# Patient Record
Sex: FEMALE | HISPANIC OR LATINO | Employment: PART TIME | URBAN - METROPOLITAN AREA
[De-identification: names, ages, dates, MRNs, and addresses within clinical notes are randomized per-mention and may not be internally consistent; named-entity substitution may affect disease eponyms.]

---

## 2017-01-01 ENCOUNTER — APPOINTMENT (OUTPATIENT)
Dept: RADIOLOGY | Facility: IMAGING CENTER | Age: 32
End: 2017-01-01
Attending: PHYSICIAN ASSISTANT
Payer: COMMERCIAL
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
## 2017-01-01 DIAGNOSIS — M53.3 COCCYDYNIA: ICD-10-CM

## 2017-01-01 PROCEDURE — 72220 X-RAY EXAM SACRUM TAILBONE: CPT | Mod: TC | Performed by: PHYSICIAN ASSISTANT

## 2017-03-16 ENCOUNTER — TELEPHONE (OUTPATIENT)
Dept: MEDICAL GROUP | Facility: PHYSICIAN GROUP | Age: 32
End: 2017-03-16

## 2017-03-16 NOTE — TELEPHONE ENCOUNTER
Called Lucy Aldrich and left a message to return my call regarding his/her upcoming NP appointment with Gene Richard M.D..   If patient returns the call please transfer them to extension: [phone] @ 8:52

## 2017-03-22 ENCOUNTER — APPOINTMENT (OUTPATIENT)
Dept: MEDICAL GROUP | Facility: PHYSICIAN GROUP | Age: 32
End: 2017-03-22
Payer: COMMERCIAL

## 2017-03-22 ENCOUNTER — OFFICE VISIT (OUTPATIENT)
Dept: MEDICAL GROUP | Facility: PHYSICIAN GROUP | Age: 32
End: 2017-03-22
Payer: COMMERCIAL

## 2017-03-22 VITALS
BODY MASS INDEX: 27.46 KG/M2 | HEART RATE: 87 BPM | OXYGEN SATURATION: 99 % | DIASTOLIC BLOOD PRESSURE: 80 MMHG | TEMPERATURE: 99.1 F | WEIGHT: 138.23 LBS | SYSTOLIC BLOOD PRESSURE: 110 MMHG

## 2017-03-22 DIAGNOSIS — J30.1 SEASONAL ALLERGIC RHINITIS DUE TO POLLEN: ICD-10-CM

## 2017-03-22 DIAGNOSIS — F41.9 ANXIETY: ICD-10-CM

## 2017-03-22 DIAGNOSIS — Z00.00 ROUTINE GENERAL MEDICAL EXAMINATION AT A HEALTH CARE FACILITY: ICD-10-CM

## 2017-03-22 DIAGNOSIS — F51.01 PRIMARY INSOMNIA: ICD-10-CM

## 2017-03-22 DIAGNOSIS — K21.9 GASTROESOPHAGEAL REFLUX DISEASE WITHOUT ESOPHAGITIS: ICD-10-CM

## 2017-03-22 DIAGNOSIS — Z97.5 CONTRACEPTIVE DEVICES, INTRAUTERINE: ICD-10-CM

## 2017-03-22 DIAGNOSIS — E04.1 THYROID NODULE: ICD-10-CM

## 2017-03-22 PROCEDURE — 99214 OFFICE O/P EST MOD 30 MIN: CPT | Performed by: INTERNAL MEDICINE

## 2017-03-22 NOTE — ASSESSMENT & PLAN NOTE
Sometimes she has panic attack, cannot breath, she breaks in hives.she takes benadryl for hives. Not on medications

## 2017-03-22 NOTE — ASSESSMENT & PLAN NOTE
She is concerned because recently she feels right side neck pain. It is sharp pain sometimes, but most of the time it constant pressure like pain. No difficulty in swallowing,no voice change, or lymphadenopathy. No radiation. Nothing makes better. She denies any night sweats or unintentional weight loss.

## 2017-03-22 NOTE — MR AVS SNAPSHOT
Lucy Aldrich   3/22/2017 4:20 PM   Office Visit   MRN: [id number]    Department:  [facility]   Dept Phone:  [phone]    Description:  Female : 1985   Provider:  Gene Richard M.D.           Reason for Visit     Pain neck right side    Nodule thryoid left      Allergies as of 3/22/2017     No Known Allergies      You were diagnosed with     Thyroid nodule   [496764]       Gastroesophageal reflux disease without esophagitis   [815522]       Anxiety   [686101]       Seasonal allergic rhinitis due to pollen   [7660754]       Contraceptive devices, intrauterine   [012169]       Primary insomnia   [403181]       Routine general medical examination at a health care facility   [V70.0.ICD-9-CM]         Vital Signs     Blood Pressure Pulse Temperature Weight Oxygen Saturation Smoking Status    110/80 mmHg 87 37.3 °C (99.1 °F) 62.7 kg (138 lb 3.7 oz) 99% Never Smoker       Basic Information     Date Of Birth Sex Race Ethnicity Preferred Language    1985 Female Unable to Obtain  Origin (French,Lebanese,Romanian,Paraguayan, etc) English      Problem List              ICD-10-CM Priority Class Noted - Resolved    Thyroid nodule E04.1   3/22/2017 - Present    Gastroesophageal reflux disease without esophagitis K21.9   3/22/2017 - Present    Anxiety F41.9   3/22/2017 - Present    Seasonal allergic rhinitis due to pollen J30.1   3/22/2017 - Present    Contraceptive devices, intrauterine Z97.5   3/22/2017 - Present    Primary insomnia F51.01   3/22/2017 - Present      Health Maintenance        Date Due Completion Dates    IMM DTaP/Tdap/Td Vaccine (1 - Tdap) 2/15/2003 ---    PAP SMEAR 2/15/2005 ---            Current Immunizations     Influenza Vaccine Quad Inj (Pf) 10/11/2016      Below and/or attached are the medications your provider expects you to take. Review all of your home medications and newly ordered medications with your provider and/or pharmacist. Follow medication instructions as  directed by your provider and/or pharmacist. Please keep your medication list with you and share with your provider. Update the information when medications are discontinued, doses are changed, or new medications (including over-the-counter products) are added; and carry medication information at all times in the event of emergency situations     Allergies:  No Known Allergies          Medications  Valid as of: March 22, 2017 -  4:59 PM    Generic Name Brand Name Tablet Size Instructions for use    Cetirizine HCl (Tab) ZYRTEC 10 MG Take 10 mg by mouth every day.        DiphenhydrAMINE HCl (Tab) BENADRYL 25 MG Take 25 mg by mouth every 6 hours as needed for Sleep.        Levonorgestrel (IUD) MIRENA 20 MCG/24HR 1 Each by Intrauterine route Once.        Pantoprazole Sodium (Tablet Delayed Response) PROTONIX 40 MG Take 40 mg by mouth every day.        Zolpidem Tartrate (Tab) AMBIEN 10 MG Take 10 mg by mouth at bedtime as needed for Sleep.        .                 Medicines prescribed today were sent to:     [facility] [address] [place] [address] [address] [facility]  [facility]    [address] [facility] [address]    Phone: [phone] Fax: [phone]    Open 24 Hours?: No      Medication refill instructions:       If your prescription bottle indicates you have medication refills left, it is not necessary to call your provider’s office. Please contact your pharmacy and they will refill your medication.    If your prescription bottle indicates you do not have any refills left, you may request refills at any time through one of the following ways: The online [employer]) system (except Urgent Care), by calling your provider’s office, or by asking your pharmacy to contact your provider’s office with a refill request. Medication refills are processed only during regular business hours and may not be available until the next business day. Your provider may request additional information or to have a  follow-up visit with you prior to refilling your medication.   *Please Note: Medication refills are assigned a new Rx number when refilled electronically. Your pharmacy may indicate that no refills were authorized even though a new prescription for the same medication is available at the pharmacy. Please request the medicine by name with the pharmacy before contacting your provider for a refill.        Your To Do List     Future Labs/Procedures Complete By Expires    CBC WITH DIFFERENTIAL  As directed 3/23/2018    COMP METABOLIC PANEL  As directed 3/23/2018    LIPID PROFILE  As directed 3/23/2018    THYROGLOBULIN, QT  As directed 3/22/2018    THYROID PEROXIDASE  (TPO) AB  As directed 3/23/2018    TSH WITH REFLEX TO FT4  As directed 3/22/2018    US-SOFT TISSUES OF HEAD - NECK  As directed 9/22/2017      Referral     A referral request has been sent to our patient care coordination department. Please allow 3-5 business days for us to process this request and contact you either by phone or mail. If you do not hear from us by the 5th business day, please call us at [phone].           Playviews Access Code: [email or web address]  Expires: 4/15/2017 12:10 PM    Playviews  A secure, online tool to manage your health information     [employer] Playviews® is a secure, online tool that connects you to your personalized health information from the privacy of your home -- day or night - making it very easy for you to manage your healthcare. Once the activation process is completed, you can even access your medical information using the Playviews alfie, which is available for free in the Apple Alfie store or Google Play store.     Playviews provides the following levels of access (as shown below):   My Chart Features   [facility] Doctor [facility]  Specialists [facility]  Urgent  Care Non-[facility]  Primary Care  Doctor   Email your healthcare team securely and privately 24/7 X X X    Manage appointments: schedule your next appointment;  view details of past/upcoming appointments X      Request prescription refills. X      View recent personal medical records, including lab and immunizations X X X X   View health record, including health history, allergies, medications X X X X   Read reports about your outpatient visits, procedures, consult and ER notes X X X X   See your discharge summary, which is a recap of your hospital and/or ER visit that includes your diagnosis, lab results, and care plan. X X       How to register for [employer]:  1. Go to  [email or web address].  2. Click on the Sign Up Now box, which takes you to the New Member Sign Up page. You will need to provide the following information:  a. Enter your [employer] Access Code exactly as it appears at the top of this page. (You will not need to use this code after you’ve completed the sign-up process. If you do not sign up before the expiration date, you must request a new code.)   b. Enter your date of birth.   c. Enter your home email address.   d. Click Submit, and follow the next screen’s instructions.  3. Create a [employer] Services ID. This will be your [employer] login ID and cannot be changed, so think of one that is secure and easy to remember.  4. Create a [employer] password. You can change your password at any time.  5. Enter your Password Reset Question and Answer. This can be used at a later time if you forget your password.   6. Enter your e-mail address. This allows you to receive e-mail notifications when new information is available in [employer].  7. Click Sign Up. You can now view your health information.    For assistance activating your [employer] account, call [phone]

## 2017-03-22 NOTE — ASSESSMENT & PLAN NOTE
Dx since she was 17. She has tried to be off PPi cannot eat anything. she has acid reflux, and burning sternal sensation her chest. She never had a EGD. She denies unintentional weight loss, hematochezia, hematemesis, melanotic stools, abdominal pain.

## 2017-03-23 NOTE — ASSESSMENT & PLAN NOTE
Rare patient takes Ambien. Usually she has been prescribed 60 tablets for 4 months. She gets them if she had panic attack.

## 2017-03-23 NOTE — PROGRESS NOTES
New Patient to Establish    Reason to establish: thyroid nodule, right side neck pain     Lucy Aldrich is a 32 y.o. female here today for evaluation and management of:    Thyroid nodule  She is concerned because recently she feels right side neck pain. It is sharp pain sometimes, but most of the time it constant pressure like pain. No difficulty in swallowing,no voice change, or lymphadenopathy. No radiation. Nothing makes better. She denies any night sweats or unintentional weight loss. She was found to have a thyroid nodule when she was in [place], few months ago in the left side. Fine-needle aspiration was done. Per patient was normal tissue. We'll try to get previous records    Anxiety  Sometimes she has panic attack, cannot breath, she breaks in hives.she takes benadryl for hives. Not on medications    Gastroesophageal reflux disease without esophagitis  Dx since she was 17. She has tried to be off PPi cannot eat anything. she has acid reflux, and burning sternal sensation her chest. She never had a EGD. She denies unintentional weight loss, hematochezia, hematemesis, melanotic stools, abdominal pain.     Contraceptive devices, intrauterine  Stable, no discharges, unusual bleeds. Changed recently. Needs a GYN for monitoring     Primary insomnia  Rare patient takes Ambien. Usually she has been prescribed 60 tablets for 4 months. She gets them if she had panic attack.     Seasonal allergic rhinitis due to pollen  She is allergic to cat, pollen. She takes zyrtec daily. Stable       Past Medical History   Diagnosis Date   • Thyroid disease    • GERD (gastroesophageal reflux disease)    • Allergy      seasonal   • Anxiety    • Depression        Current Outpatient Prescriptions   Medication Sig Dispense Refill   • zolpidem (AMBIEN) 10 MG Tab Take 10 mg by mouth at bedtime as needed for Sleep.     • cetirizine (ZYRTEC) 10 MG Tab Take 10 mg by mouth every day.     • diphenhydrAMINE (BENADRYL) 25 MG Tab Take  25 mg by mouth every 6 hours as needed for Sleep.     • levonorgestrel (MIRENA) 20 MCG/24HR IUD 1 Each by Intrauterine route Once.     • pantoprazole (PROTONIX) 40 MG Tablet Delayed Response Take 40 mg by mouth every day.       No current facility-administered medications for this visit.       Allergies as of 03/22/2017   • (No Known Allergies)       Social History     Social History   • Marital Status:      Spouse Name: N/A   • Number of Children: N/A   • Years of Education: N/A     Occupational History   • Not on file.     Social History Main Topics   • Smoking status: Never Smoker    • Smokeless tobacco: Never Used   • Alcohol Use: 0.6 oz/week     1 Shots of liquor per week      Comment: rare   • Drug Use: No   • Sexual Activity: Yes     Other Topics Concern   • Not on file     Social History Narrative       Family History   Problem Relation Age of Onset   • Stroke Mother    • Hyperlipidemia Mother    • Diabetes Father    • Thyroid Sister    • Cancer Neg Hx        Past Surgical History   Procedure Laterality Date   • Abdominal exploration       exploratory   • Open reduction       tail bone   • Cholecystectomy         ROS: All systems reviewed were negative except for history of present illness    /80 mmHg  Pulse 87  Temp(Src) 37.3 °C (99.1 °F)  Wt 62.7 kg (138 lb 3.7 oz)  SpO2 99%    Physical Exam  General:  Alert and oriented, No apparent distress.  Eyes: Pupils equal and reactive. No scleral icterus. EOMI, exophthalmos ??   Throat: Clear no erythema or exudates noted. Oral mucosa moist, oral dental intact  Neck: Supple. No cervical or supraclavicular lymphadenopathy noted. Thyroid not enlarged, I think I feel a nodule on the right side, soft, and smaller one on the left side .  Lungs: normal effort,  Clear to auscultation  Cardiovascular: Regular rate and rhythm. No murmurs, rubs or gallops, pulses intact   Abdomen:  Soft, +BS, no tenderness. No rebound or guarding noted. No hepato or  splenomegaly   Extremities: No clubbing, cyanosis, edema.  Neuro: cranial nerves intact, sensation intact   Muscle skeletal: moves all extremities   Skin: Clear. No rash or suspicious skin lesions noted.      Assessment and Plan    1. Thyroid nodule  Multigoiter, vs graves vs cold nodule ??   Will try to get previous records   - TSH WITH REFLEX TO FT4; Future  - CBC WITH DIFFERENTIAL; Future  - THYROID PEROXIDASE  (TPO) AB; Future  - TRIIODOTHYRONINE (T3)  - THYROGLOBULIN, QT; Future  - ANTITHYROGLOBULIN AB  - TSI  - US-SOFT TISSUES OF HEAD - NECK; Future    2. Gastroesophageal reflux disease without esophagitis  Stable continue current regimen.    3. Anxiety  Stable. No need for treatment    4. Seasonal allergic rhinitis due to pollen  Stable continue Zyrtec steadily.    5. Contraceptive devices, intrauterine  Referral to gynecology for pap-smear and continuing monitoring   - REFERRAL TO GYNECOLOGY    6. Primary insomnia  Continue Ambien as needed  - COMP METABOLIC PANEL; Future    7. Routine general medical examination at a health care facility    - LIPID PROFILE; Future  - COMP METABOLIC PANEL; Future      Followup: Return in about 3 weeks (around 4/12/2017) for Short.    Signed by: Gene Richard M.D.

## 2017-03-24 ENCOUNTER — HOSPITAL ENCOUNTER (OUTPATIENT)
Dept: LAB | Facility: MEDICAL CENTER | Age: 32
End: 2017-03-24
Attending: INTERNAL MEDICINE
Payer: COMMERCIAL

## 2017-03-24 DIAGNOSIS — E04.1 THYROID NODULE: ICD-10-CM

## 2017-03-24 DIAGNOSIS — F51.01 PRIMARY INSOMNIA: ICD-10-CM

## 2017-03-24 DIAGNOSIS — Z00.00 ROUTINE GENERAL MEDICAL EXAMINATION AT A HEALTH CARE FACILITY: ICD-10-CM

## 2017-03-24 LAB
ALBUMIN SERPL BCP-MCNC: 4.2 G/DL (ref 3.2–4.9)
ALBUMIN/GLOB SERPL: 1.2 G/DL
ALP SERPL-CCNC: 62 U/L (ref 30–99)
ALT SERPL-CCNC: 15 U/L (ref 2–50)
ANION GAP SERPL CALC-SCNC: 8 MMOL/L (ref 0–11.9)
AST SERPL-CCNC: 14 U/L (ref 12–45)
BASOPHILS # BLD AUTO: 0.02 K/UL (ref 0–0.12)
BASOPHILS NFR BLD AUTO: 0.3 % (ref 0–1.8)
BILIRUB SERPL-MCNC: 0.3 MG/DL (ref 0.1–1.5)
BUN SERPL-MCNC: 15 MG/DL (ref 8–22)
CALCIUM SERPL-MCNC: 9.5 MG/DL (ref 8.5–10.5)
CHLORIDE SERPL-SCNC: 104 MMOL/L (ref 96–112)
CHOLEST SERPL-MCNC: 154 MG/DL (ref 100–199)
CO2 SERPL-SCNC: 26 MMOL/L (ref 20–33)
CREAT SERPL-MCNC: 0.6 MG/DL (ref 0.5–1.4)
EOSINOPHIL # BLD: 0.18 K/UL (ref 0–0.51)
EOSINOPHIL NFR BLD AUTO: 2.6 % (ref 0–6.9)
ERYTHROCYTE [DISTWIDTH] IN BLOOD BY AUTOMATED COUNT: 38.7 FL (ref 35.9–50)
GLOBULIN SER CALC-MCNC: 3.5 G/DL (ref 1.9–3.5)
GLUCOSE SERPL-MCNC: 106 MG/DL (ref 65–99)
HCT VFR BLD AUTO: 40.8 % (ref 37–47)
HDLC SERPL-MCNC: 48 MG/DL
HGB BLD-MCNC: 13.3 G/DL (ref 12–16)
IMM GRANULOCYTES # BLD AUTO: 0.01 K/UL (ref 0–0.11)
IMM GRANULOCYTES NFR BLD AUTO: 0.1 % (ref 0–0.9)
LDLC SERPL CALC-MCNC: 95 MG/DL
LYMPHOCYTES # BLD: 2.18 K/UL (ref 1–4.8)
LYMPHOCYTES NFR BLD AUTO: 30.9 % (ref 22–41)
MCH RBC QN AUTO: 25.5 PG (ref 27–33)
MCHC RBC AUTO-ENTMCNC: 32.6 G/DL (ref 33.6–35)
MCV RBC AUTO: 78.3 FL (ref 81.4–97.8)
MONOCYTES # BLD: 0.42 K/UL (ref 0–0.85)
MONOCYTES NFR BLD AUTO: 6 % (ref 0–13.4)
NEUTROPHILS # BLD: 4.24 K/UL (ref 2–7.15)
NEUTROPHILS NFR BLD AUTO: 60.1 % (ref 44–72)
NRBC # BLD AUTO: 0 K/UL
NRBC BLD-RTO: 0 /100 WBC
PLATELET # BLD AUTO: 391 K/UL (ref 164–446)
PMV BLD AUTO: 9.5 FL (ref 9–12.9)
POTASSIUM SERPL-SCNC: 3.7 MMOL/L (ref 3.6–5.5)
PROT SERPL-MCNC: 7.7 G/DL (ref 6–8.2)
RBC # BLD AUTO: 5.21 M/UL (ref 4.2–5.4)
SODIUM SERPL-SCNC: 138 MMOL/L (ref 135–145)
THYROPEROXIDASE AB SERPL-ACNC: 1.4 IU/ML (ref 0–9)
TRIGL SERPL-MCNC: 53 MG/DL (ref 0–149)
TSH SERPL DL<=0.005 MIU/L-ACNC: 2.14 UIU/ML (ref 0.3–3.7)
WBC # BLD AUTO: 7.1 K/UL (ref 4.8–10.8)

## 2017-03-24 PROCEDURE — 80053 COMPREHEN METABOLIC PANEL: CPT

## 2017-03-24 PROCEDURE — 84432 ASSAY OF THYROGLOBULIN: CPT

## 2017-03-24 PROCEDURE — 85025 COMPLETE CBC W/AUTO DIFF WBC: CPT

## 2017-03-24 PROCEDURE — 36415 COLL VENOUS BLD VENIPUNCTURE: CPT

## 2017-03-24 PROCEDURE — 84443 ASSAY THYROID STIM HORMONE: CPT

## 2017-03-24 PROCEDURE — 86800 THYROGLOBULIN ANTIBODY: CPT

## 2017-03-24 PROCEDURE — 86376 MICROSOMAL ANTIBODY EACH: CPT

## 2017-03-24 PROCEDURE — 80061 LIPID PANEL: CPT

## 2017-03-30 ENCOUNTER — HOSPITAL ENCOUNTER (OUTPATIENT)
Dept: LAB | Facility: MEDICAL CENTER | Age: 32
End: 2017-03-30
Attending: INTERNAL MEDICINE
Payer: COMMERCIAL

## 2017-03-30 ENCOUNTER — HOSPITAL ENCOUNTER (OUTPATIENT)
Dept: RADIOLOGY | Facility: MEDICAL CENTER | Age: 32
End: 2017-03-30
Attending: INTERNAL MEDICINE
Payer: COMMERCIAL

## 2017-03-30 DIAGNOSIS — R73.01 ELEVATED FASTING GLUCOSE: ICD-10-CM

## 2017-03-30 DIAGNOSIS — E04.1 THYROID NODULE: ICD-10-CM

## 2017-03-30 DIAGNOSIS — R71.8 MICROCYTOSIS: ICD-10-CM

## 2017-03-30 LAB
EST. AVERAGE GLUCOSE BLD GHB EST-MCNC: 114 MG/DL
FOLATE SERPL-MCNC: 8.6 NG/ML
HBA1C MFR BLD: 5.6 % (ref 0–5.6)
IRON SATN MFR SERPL: 6 % (ref 15–55)
IRON SERPL-MCNC: 32 UG/DL (ref 40–170)
TIBC SERPL-MCNC: 542 UG/DL (ref 250–450)
VIT B12 SERPL-MCNC: 176 PG/ML (ref 211–911)

## 2017-03-30 PROCEDURE — 82607 VITAMIN B-12: CPT

## 2017-03-30 PROCEDURE — 76536 US EXAM OF HEAD AND NECK: CPT

## 2017-03-30 PROCEDURE — 82746 ASSAY OF FOLIC ACID SERUM: CPT

## 2017-03-30 PROCEDURE — 83550 IRON BINDING TEST: CPT

## 2017-03-30 PROCEDURE — 83036 HEMOGLOBIN GLYCOSYLATED A1C: CPT

## 2017-03-30 PROCEDURE — 83540 ASSAY OF IRON: CPT

## 2017-03-30 PROCEDURE — 36415 COLL VENOUS BLD VENIPUNCTURE: CPT

## 2017-03-31 ENCOUNTER — TELEPHONE (OUTPATIENT)
Dept: MEDICAL GROUP | Facility: PHYSICIAN GROUP | Age: 32
End: 2017-03-31

## 2017-03-31 LAB
THYROGLOB AB SERPL-ACNC: 60.3 IU/ML (ref 0–4)
THYROGLOB SERPL-MCNC: 1.7 NG/ML (ref 1.3–31.8)
THYROGLOB SERPL-MCNC: ABNORMAL NG/ML (ref 1.3–31.8)

## 2017-03-31 NOTE — TELEPHONE ENCOUNTER
----- Message from Gene Richard M.D. sent at 3/31/2017  7:50 AM PDT -----  Please let the patient know that vitamin B12 is very low, she needs to come for [place] visits for b12 shot once a week for three weeks in a row. Please also advice to take vitamin b12 tab 1000 mcg over the encounter  Also iron is low, please advise pt to take iron pills over the encounter.   She needs follow up with me in 4 months, short, and we will recheck lab work again   Please let me know if patient has any further questions..     Thank you,  Gene Richard M.D.

## 2017-03-31 NOTE — TELEPHONE ENCOUNTER
1. Caller Name: Lucy Aldrich                      Call Back Number: [phone] (home)     2. Message: Unable to reach pt left vm to call back.     3. Patient approves office to leave a detailed voicemail/MyChart message: N\A

## 2017-04-03 ENCOUNTER — NON-PROVIDER VISIT (OUTPATIENT)
Dept: MEDICAL GROUP | Facility: PHYSICIAN GROUP | Age: 32
End: 2017-04-03
Payer: COMMERCIAL

## 2017-04-03 DIAGNOSIS — E53.8 B12 DEFICIENCY: ICD-10-CM

## 2017-04-03 PROCEDURE — 96372 THER/PROPH/DIAG INJ SC/IM: CPT | Performed by: NURSE PRACTITIONER

## 2017-04-03 RX ORDER — CYANOCOBALAMIN 1000 UG/ML
1000 INJECTION, SOLUTION INTRAMUSCULAR; SUBCUTANEOUS ONCE
Status: COMPLETED | OUTPATIENT
Start: 2017-04-03 | End: 2017-04-03

## 2017-04-03 RX ADMIN — CYANOCOBALAMIN 1000 MCG: 1000 INJECTION, SOLUTION INTRAMUSCULAR; SUBCUTANEOUS at 09:47

## 2017-04-03 NOTE — MR AVS SNAPSHOT
Lucy Aldrich   4/3/2017 9:30 AM   Non-Provider Visit   MRN: [id number]    Department:  [facility]   Dept Phone:  [phone]    Description:  Female : 1985   Provider:  [facility], [facility]           Reason for Visit     Injections b 12      Allergies as of 4/3/2017     No Known Allergies      Vital Signs     Smoking Status                   Never Smoker            Basic Information     Date Of Birth Sex Race Ethnicity Preferred Language    1985 Female Unable to Obtain  Origin (Guamanian,Honduran,Comoran,Sudanese, etc) English      Your appointments     2017  4:00 PM   Established Patient with Gene Richard M.D.   [facility] (--)    [address]  [address]  [place] [address] [phone]           You will be receiving a confirmation call a few days before your appointment from our automated call confirmation system.              Problem List              ICD-10-CM Priority Class Noted - Resolved    Thyroid nodule E04.1   3/22/2017 - Present    Gastroesophageal reflux disease without esophagitis K21.9   3/22/2017 - Present    Anxiety F41.9   3/22/2017 - Present    Seasonal allergic rhinitis due to pollen J30.1   3/22/2017 - Present    Contraceptive devices, intrauterine Z97.5   3/22/2017 - Present    Primary insomnia F51.01   3/22/2017 - Present      Health Maintenance        Date Due Completion Dates    IMM DTaP/Tdap/Td Vaccine (1 - Tdap) 2/15/2003 ---    PAP SMEAR 2/15/2005 ---            Current Immunizations     Influenza Vaccine Quad Inj (Pf) 10/11/2016      Below and/or attached are the medications your provider expects you to take. Review all of your home medications and newly ordered medications with your provider and/or pharmacist. Follow medication instructions as directed by your provider and/or pharmacist. Please keep your medication list with you and share with your provider. Update the information when medications are discontinued, doses are  changed, or new medications (including over-the-counter products) are added; and carry medication information at all times in the event of emergency situations     Allergies:  No Known Allergies          Medications  Valid as of: April 03, 2017 -  9:44 AM    Generic Name Brand Name Tablet Size Instructions for use    Cetirizine HCl (Tab) ZYRTEC 10 MG Take 10 mg by mouth every day.        DiphenhydrAMINE HCl (Tab) BENADRYL 25 MG Take 25 mg by mouth every 6 hours as needed for Sleep.        Levonorgestrel (IUD) MIRENA 20 MCG/24HR 1 Each by Intrauterine route Once.        Pantoprazole Sodium (Tablet Delayed Response) PROTONIX 40 MG Take 40 mg by mouth every day.        Zolpidem Tartrate (Tab) AMBIEN 10 MG Take 10 mg by mouth at bedtime as needed for Sleep.        .                 Medicines prescribed today were sent to:     [facility] [address] [facility] [place] - [address] [facility]  [facility]    [address] [facility] [address]    Phone: [phone] Fax: [phone]    Open 24 Hours?: No      Medication refill instructions:       If your prescription bottle indicates you have medication refills left, it is not necessary to call your provider’s office. Please contact your pharmacy and they will refill your medication.    If your prescription bottle indicates you do not have any refills left, you may request refills at any time through one of the following ways: The online [employer] system (except Urgent Care), by calling your provider’s office, or by asking your pharmacy to contact your provider’s office with a refill request. Medication refills are processed only during regular business hours and may not be available until the next business day. Your provider may request additional information or to have a follow-up visit with you prior to refilling your medication.   *Please Note: Medication refills are assigned a new Rx number when refilled electronically. Your pharmacy may indicate that  no refills were authorized even though a new prescription for the same medication is available at the pharmacy. Please request the medicine by name with the pharmacy before contacting your provider for a refill.           [employer]art Access Code: Activation code not generated  Current Tuteet Status: Active

## 2017-04-03 NOTE — NON-PROVIDER
Lucy Aldrich is a 32 y.o. female here for a non-provider visit for B12 injection.    Reason for injection: Vitamin B12  Deficiency   Order in MAR?: Yes  Patient supplied?:No  Minimum interval has been met for this injection (per MAR order): Yes    Patient tolerated injection and no adverse effects were observed or reported Yes.    # of Administrations remaining in MAR:1 of 3

## 2017-04-05 ENCOUNTER — PATIENT MESSAGE (OUTPATIENT)
Dept: MEDICAL GROUP | Facility: PHYSICIAN GROUP | Age: 32
End: 2017-04-05

## 2017-04-05 DIAGNOSIS — E04.1 THYROID NODULE: ICD-10-CM

## 2017-04-12 ENCOUNTER — OFFICE VISIT (OUTPATIENT)
Dept: MEDICAL GROUP | Facility: PHYSICIAN GROUP | Age: 32
End: 2017-04-12
Payer: COMMERCIAL

## 2017-04-12 VITALS
BODY MASS INDEX: 27.6 KG/M2 | HEIGHT: 59 IN | HEART RATE: 85 BPM | TEMPERATURE: 99.3 F | DIASTOLIC BLOOD PRESSURE: 80 MMHG | OXYGEN SATURATION: 97 % | WEIGHT: 136.91 LBS | SYSTOLIC BLOOD PRESSURE: 110 MMHG

## 2017-04-12 DIAGNOSIS — E04.1 THYROID NODULE: ICD-10-CM

## 2017-04-12 DIAGNOSIS — E53.8 VITAMIN B12 DEFICIENCY: ICD-10-CM

## 2017-04-12 DIAGNOSIS — J30.1 SEASONAL ALLERGIC RHINITIS DUE TO POLLEN: ICD-10-CM

## 2017-04-12 DIAGNOSIS — R71.8 MICROCYTOSIS: ICD-10-CM

## 2017-04-12 PROCEDURE — 99214 OFFICE O/P EST MOD 30 MIN: CPT | Mod: 25 | Performed by: INTERNAL MEDICINE

## 2017-04-12 PROCEDURE — 96372 THER/PROPH/DIAG INJ SC/IM: CPT | Performed by: INTERNAL MEDICINE

## 2017-04-12 RX ORDER — CYANOCOBALAMIN 1000 UG/ML
1000 INJECTION, SOLUTION INTRAMUSCULAR; SUBCUTANEOUS ONCE
Status: COMPLETED | OUTPATIENT
Start: 2017-04-12 | End: 2017-04-12

## 2017-04-12 RX ORDER — CYANOCOBALAMIN 1000 UG/ML
1000 INJECTION, SOLUTION INTRAMUSCULAR; SUBCUTANEOUS ONCE
Qty: 1 ML | Refills: 0 | Status: SHIPPED | OUTPATIENT
Start: 2017-04-12 | End: 2017-04-12

## 2017-04-12 RX ADMIN — CYANOCOBALAMIN 1000 MCG: 1000 INJECTION, SOLUTION INTRAMUSCULAR; SUBCUTANEOUS at 16:58

## 2017-04-12 ASSESSMENT — PATIENT HEALTH QUESTIONNAIRE - PHQ9: CLINICAL INTERPRETATION OF PHQ2 SCORE: 1

## 2017-04-12 NOTE — MR AVS SNAPSHOT
"        Lucy Aldrich   2017 4:00 PM   Office Visit   MRN: [id number]    Department:  [facility]   Dept Phone:  [phone]    Description:  Female : 1985   Provider:  Gene Richard M.D.           Reason for Visit     Other follow up labs      Allergies as of 2017     No Known Allergies      You were diagnosed with     Thyroid nodule   [358612]       Seasonal allergic rhinitis due to pollen   [8158124]       Vitamin B12 deficiency   [442549]       Microcytosis   [923323]         Vital Signs     Blood Pressure Pulse Temperature Height Weight Body Mass Index    110/80 mmHg 85 37.4 °C (99.3 °F) 1.499 m (4' 11\") 62.1 kg (136 lb 14.5 oz) 27.64 kg/m2    Oxygen Saturation Smoking Status                97% Never Smoker           Basic Information     Date Of Birth Sex Race Ethnicity Preferred Language    1985 Female Unable to Obtain  Origin (Bulgarian,Puerto Rican,Singaporean,Spanish, etc) English      Your appointments     2017  7:20 AM   New Patient with Han Esposito M.D.   [facility] ([facility]    [address], [address]  [facility] [address] [phone]           Please bring Photo ID, Insurance Cards, All Medication Bottles and copies of any legal documents (such as Living Will, Power of ) If speaking a language besides English please bring an adult . Please arrive 30 minutes prior for check in and registration. You will be receiving a confirmation call a few days before your appointment from our automated call confirmation system.              Problem List              ICD-10-CM Priority Class Noted - Resolved    Thyroid nodule E04.1   3/22/2017 - Present    Gastroesophageal reflux disease without esophagitis K21.9   3/22/2017 - Present    Anxiety F41.9   3/22/2017 - Present    Seasonal allergic rhinitis due to pollen J30.1   3/22/2017 - Present    Contraceptive devices, intrauterine Z97.5   3/22/2017 - Present   " Primary insomnia F51.01   3/22/2017 - Present    Vitamin B12 deficiency E53.8   4/12/2017 - Present    Microcytosis R71.8   4/12/2017 - Present      Health Maintenance        Date Due Completion Dates    IMM DTaP/Tdap/Td Vaccine (1 - Tdap) 2/15/2003 ---    PAP SMEAR 2/15/2005 ---            Current Immunizations     Influenza Vaccine Quad Inj (Pf) 10/11/2016      Below and/or attached are the medications your provider expects you to take. Review all of your home medications and newly ordered medications with your provider and/or pharmacist. Follow medication instructions as directed by your provider and/or pharmacist. Please keep your medication list with you and share with your provider. Update the information when medications are discontinued, doses are changed, or new medications (including over-the-counter products) are added; and carry medication information at all times in the event of emergency situations     Allergies:  No Known Allergies          Medications  Valid as of: April 12, 2017 -  4:35 PM    Generic Name Brand Name Tablet Size Instructions for use    Cetirizine HCl (Tab) ZYRTEC 10 MG Take 10 mg by mouth every day.        Cyanocobalamin (Solution) VITAMIN B-12 1000 MCG/ML 1 mL by Intramuscular route Once for 1 dose.        DiphenhydrAMINE HCl (Tab) BENADRYL 25 MG Take 25 mg by mouth every 6 hours as needed for Sleep.        Levonorgestrel (IUD) MIRENA 20 MCG/24HR 1 Each by Intrauterine route Once.        Pantoprazole Sodium (Tablet Delayed Response) PROTONIX 40 MG Take 40 mg by mouth every day.        Zolpidem Tartrate (Tab) AMBIEN 10 MG Take 10 mg by mouth at bedtime as needed for Sleep.        .                 Medicines prescribed today were sent to:     [id number] [place] - [address] AT [facility] [address]    [address] [address]    Phone: [phone] Fax: [phone]    Open 24 Hours?: No      Medication refill instructions:       If  your prescription bottle indicates you have medication refills left, it is not necessary to call your provider’s office. Please contact your pharmacy and they will refill your medication.    If your prescription bottle indicates you do not have any refills left, you may request refills at any time through one of the following ways: The online [employer] system (except Urgent Care), by calling your provider’s office, or by asking your pharmacy to contact your provider’s office with a refill request. Medication refills are processed only during regular business hours and may not be available until the next business day. Your provider may request additional information or to have a follow-up visit with you prior to refilling your medication.   *Please Note: Medication refills are assigned a new Rx number when refilled electronically. Your pharmacy may indicate that no refills were authorized even though a new prescription for the same medication is available at the pharmacy. Please request the medicine by name with the pharmacy before contacting your provider for a refill.        Your To Do List     Future Labs/Procedures Complete By Expires    CBC WITH DIFFERENTIAL  As directed 4/13/2018    COMP METABOLIC PANEL  As directed 4/13/2018    FERRITIN  As directed 4/13/2018    FOLATE  As directed 4/13/2018    IRON/TOTAL IRON BIND  As directed 4/13/2018    TSH WITH REFLEX TO FT4  As directed 4/12/2018    VITAMIN B12  As directed 4/13/2018         [employer] Access Code: Activation code not generated  Current [employer] Status: Active

## 2017-04-13 NOTE — PROGRESS NOTES
Subjective:   Lucy Aldrich is a 32 y.o. female here today for review lab work    Microcytosis  MCV 78, mild iron deficiency. hgb normal. No Previous history of thalassemia. Patient has a Mirena IUD placed therefore she does not get monthly menstrual cycle. this month she got the menstrual cycle. No menstrual cycle for 6 months prior to this. Likely her iron deficiencies related to her GYN bleeding. Denies hematochezia, no abdominal pain    Seasonal allergic rhinitis due to pollen  She is allergic to cats and pollen. She takes zyrtec daily. Stable    Thyroid nodule  Thyroid ultrasound was done and solid thyroid nodule and 1. 9 cm. 03/30/2017  small hypoechoic solid nodule in the right mid gland measuring 5.0 x 4.4 x 1.9 mm, left one stable. TSH, free T4 normal.  She still continues to have a pressure-like pain in her throat. She denies any dysphagia she is asking for pain medication. She tells me that ibuprofen not helping    Vitamin B12 deficiency  B12, is 176         Current medicines (including changes today)  Current Outpatient Prescriptions   Medication Sig Dispense Refill   • cyanocobalamin (VITAMIN B-12) 1000 MCG/ML Solution 1 mL by Intramuscular route Once for 1 dose. 1 mL 0   • zolpidem (AMBIEN) 10 MG Tab Take 10 mg by mouth at bedtime as needed for Sleep.     • cetirizine (ZYRTEC) 10 MG Tab Take 10 mg by mouth every day.     • diphenhydrAMINE (BENADRYL) 25 MG Tab Take 25 mg by mouth every 6 hours as needed for Sleep.     • levonorgestrel (MIRENA) 20 MCG/24HR IUD 1 Each by Intrauterine route Once.     • pantoprazole (PROTONIX) 40 MG Tablet Delayed Response Take 40 mg by mouth every day.       No current facility-administered medications for this visit.     She  has a past medical history of Thyroid disease; GERD (gastroesophageal reflux disease); Allergy; Anxiety; and Depression.    Current Outpatient Prescriptions   Medication Sig Dispense Refill   • cyanocobalamin (VITAMIN B-12) 1000 MCG/ML  "Solution 1 mL by Intramuscular route Once for 1 dose. 1 mL 0   • zolpidem (AMBIEN) 10 MG Tab Take 10 mg by mouth at bedtime as needed for Sleep.     • cetirizine (ZYRTEC) 10 MG Tab Take 10 mg by mouth every day.     • diphenhydrAMINE (BENADRYL) 25 MG Tab Take 25 mg by mouth every 6 hours as needed for Sleep.     • levonorgestrel (MIRENA) 20 MCG/24HR IUD 1 Each by Intrauterine route Once.     • pantoprazole (PROTONIX) 40 MG Tablet Delayed Response Take 40 mg by mouth every day.       No current facility-administered medications for this visit.       Allergies as of 04/12/2017   • (No Known Allergies)       Social History     Social History   • Marital Status:      Spouse Name: N/A   • Number of Children: N/A   • Years of Education: N/A     Occupational History   • Not on file.     Social History Main Topics   • Smoking status: Never Smoker    • Smokeless tobacco: Never Used   • Alcohol Use: 0.6 oz/week     1 Shots of liquor per week      Comment: rare   • Drug Use: No   • Sexual Activity: Yes     Other Topics Concern   • Not on file     Social History Narrative        Family History   Problem Relation Age of Onset   • Stroke Mother    • Hyperlipidemia Mother    • Diabetes Father    • Thyroid Sister    • Cancer Neg Hx        Past Surgical History   Procedure Laterality Date   • Abdominal exploration       exploratory   • Open reduction       tail bone   • Cholecystectomy         ROS   All systems reviewed are negative except for history of present illness       Objective:     Blood pressure 110/80, pulse 85, temperature 37.4 °C (99.3 °F), height 1.499 m (4' 11\"), weight 62.1 kg (136 lb 14.5 oz), SpO2 97 %. Body mass index is 27.64 kg/(m^2).   Physical Exam:  Constitutional: Alert, no distress.  Skin: Warm, dry, good turgor, no rashes in visible areas.  Eye: Equal, round and reactive, conjunctiva clear, lids normal.  ENMT: Lips without lesions, good dentition, oropharynx clear.  Neck: Trachea midline, no masses, " right small thyroid nodule . No cervical or supraclavicular lymphadenopathy  Respiratory: Unlabored respiratory effort, lungs clear to auscultation, no wheezes, no ronchi.  Cardiovascular: Normal S1, S2, no murmur, no edema.  Abdomen: Soft, non-tender, no masses, no hepatosplenomegaly.  Psych: Alert and oriented x3, normal affect and mood.        Assessment and Plan:   The following treatment plan was discussed    1. Thyroid nodule  1.9 cm on right side. Endo to follow for possible FNA   - TSH WITH REFLEX TO FT4; Future    2. Seasonal allergic rhinitis due to pollen  Stable continue same regimen    3. Vitamin B12 deficiency  Continue vitamin B12 injections for 3 weeks then continue just oral vitamin B12. We'll reevaluate in 2months   - cyanocobalamin (VITAMIN B-12) 1000 MCG/ML Solution; 1 mL by Intramuscular route Once for 1 dose.  Dispense: 1 mL; Refill: 0  - cyanocobalamin (VITAMIN B-12) injection 1,000 mcg; 1 mL by Intramuscular route Once.    4. Microcytosis  Likely slight iron deficiency due to GYN loss. Continue to monitor and reevaluate in 2 months  - CBC WITH DIFFERENTIAL; Future  - COMP METABOLIC PANEL; Future  - TSH WITH REFLEX TO FT4; Future  - IRON/TOTAL IRON BIND; Future  - FOLATE; Future  - FERRITIN; Future  - OCCULT BLOOD, FECAL IMMUNOASSAY      Followup: Return in about 2 months (around 6/12/2017).

## 2017-04-13 NOTE — ASSESSMENT & PLAN NOTE
MCV 78, mild iron deficiency. hgb normal. No Previous history of thalassemia. Patient has a Mirena IUD placed therefore she does not get monthly menstrual cycle. this month she got the menstrual cycle. No menstrual cycle for 6 months prior to this. Likely her iron deficiencies related to her GYN bleeding. Denies hematochezia, no abdominal pain

## 2017-04-13 NOTE — ASSESSMENT & PLAN NOTE
Thyroid ultrasound was done and solid thyroid nodule and 1. 9 cm. 03/30/2017  small hypoechoic solid nodule in the right mid gland measuring 5.0 x 4.4 x 1.9 mm, left one stable. TSH, free T4 normal.  She still continues to have a pressure-like pain in her throat. She denies any dysphagia she is asking for pain medication. She tells me that ibuprofen not helping

## 2017-04-19 ENCOUNTER — NON-PROVIDER VISIT (OUTPATIENT)
Dept: MEDICAL GROUP | Facility: PHYSICIAN GROUP | Age: 32
End: 2017-04-19
Payer: COMMERCIAL

## 2017-04-19 DIAGNOSIS — E53.8 VITAMIN B12 DEFICIENCY: ICD-10-CM

## 2017-04-19 PROCEDURE — 96372 THER/PROPH/DIAG INJ SC/IM: CPT | Performed by: INTERNAL MEDICINE

## 2017-04-19 RX ORDER — CYANOCOBALAMIN 1000 UG/ML
1000 INJECTION, SOLUTION INTRAMUSCULAR; SUBCUTANEOUS ONCE
Status: COMPLETED | OUTPATIENT
Start: 2017-04-19 | End: 2017-04-19

## 2017-04-19 RX ADMIN — CYANOCOBALAMIN 1000 MCG: 1000 INJECTION, SOLUTION INTRAMUSCULAR; SUBCUTANEOUS at 09:36

## 2017-04-19 NOTE — PROGRESS NOTES
uLcy Aldrich is a 32 y.o. female here for a non-provider visit for B12 injection.    Reason for injection: Ordered by   Order in MAR?: Yes  Patient supplied?:No  Minimum interval has been met for this injection (per MAR order): Yes    Order and dose verified by:   Patient tolerated injection and no adverse effects were observed or reported Yes.    # of Administrations remaining in MAR:0    LOT [id number]  EXP 07/17  Right Deltoid

## 2017-04-19 NOTE — MR AVS SNAPSHOT
Lucy Aldrich   2017 9:30 AM   Non-Provider Visit   MRN: [id number]    Department:  [facility]   Dept Phone:  [phone]    Description:  Female : 1985   Provider:  COLE DUMONT GR[facility], MA           Reason for Visit     Injections B12      Allergies as of 2017     No Known Allergies      You were diagnosed with     Vitamin B12 deficiency   [286236]         Vital Signs     Smoking Status                   Never Smoker            Basic Information     Date Of Birth Sex Race Ethnicity Preferred Language    1985 Female Unable to Obtain  Origin (Icelandic,Swiss,Indian,Thomas, etc) English      Your appointments     2017  7:20 AM   New Patient with Han Esposito M.D.   [facility]    [address], [address]  [facility] [address] [phone]           Please bring Photo ID, Insurance Cards, All Medication Bottles and copies of any legal documents (such as Living Will, Power of ) If speaking a language besides English please bring an adult . Please arrive 30 minutes prior for check in and registration. You will be receiving a confirmation call a few days before your appointment from our automated call confirmation system.            2017  2:20 PM   Established Patient with Gene Richard M.D.   [facility] (--)    [address]  [address]  [facility] [address] [phone]           You will be receiving a confirmation call a few days before your appointment from our automated call confirmation system.              Problem List              ICD-10-CM Priority Class Noted - Resolved    Thyroid nodule E04.1   3/22/2017 - Present    Gastroesophageal reflux disease without esophagitis K21.9   3/22/2017 - Present    Anxiety F41.9   3/22/2017 - Present    Seasonal allergic rhinitis due to pollen J30.1   3/22/2017 - Present    Contraceptive devices, intrauterine Z97.5   3/22/2017 -  Present    Primary insomnia F51.01   3/22/2017 - Present    Vitamin B12 deficiency E53.8   4/12/2017 - Present    Microcytosis R71.8   4/12/2017 - Present      Health Maintenance        Date Due Completion Dates    IMM DTaP/Tdap/Td Vaccine (1 - Tdap) 2/15/2003 ---    PAP SMEAR 2/15/2005 ---            Current Immunizations     Influenza Vaccine Quad Inj (Pf) 10/11/2016      Below and/or attached are the medications your provider expects you to take. Review all of your home medications and newly ordered medications with your provider and/or pharmacist. Follow medication instructions as directed by your provider and/or pharmacist. Please keep your medication list with you and share with your provider. Update the information when medications are discontinued, doses are changed, or new medications (including over-the-counter products) are added; and carry medication information at all times in the event of emergency situations     Allergies:  No Known Allergies          Medications  Valid as of: April 19, 2017 - 10:32 AM    Generic Name Brand Name Tablet Size Instructions for use    Cetirizine HCl (Tab) ZYRTEC 10 MG Take 10 mg by mouth every day.        DiphenhydrAMINE HCl (Tab) BENADRYL 25 MG Take 25 mg by mouth every 6 hours as needed for Sleep.        Levonorgestrel (IUD) MIRENA 20 MCG/24HR 1 Each by Intrauterine route Once.        Pantoprazole Sodium (Tablet Delayed Response) PROTONIX 40 MG Take 40 mg by mouth every day.        Zolpidem Tartrate (Tab) AMBIEN 10 MG Take 10 mg by mouth at bedtime as needed for Sleep.        .                 Medicines prescribed today were sent to:     [id number] - [place] - [address] AT [facility] [address]    [address] [address]    Phone: [phone] Fax: [phone]    Open 24 Hours?: No      Medication refill instructions:       If your prescription bottle indicates you have medication refills left, it is not necessary to  call your provider’s office. Please contact your pharmacy and they will refill your medication.    If your prescription bottle indicates you do not have any refills left, you may request refills at any time through one of the following ways: The online [employer] system (except Urgent Care), by calling your provider’s office, or by asking your pharmacy to contact your provider’s office with a refill request. Medication refills are processed only during regular business hours and may not be available until the next business day. Your provider may request additional information or to have a follow-up visit with you prior to refilling your medication.   *Please Note: Medication refills are assigned a new Rx number when refilled electronically. Your pharmacy may indicate that no refills were authorized even though a new prescription for the same medication is available at the pharmacy. Please request the medicine by name with the pharmacy before contacting your provider for a refill.           [employer] Access Code: Activation code not generated  Current [employer] Status: Active

## 2017-06-05 ENCOUNTER — OFFICE VISIT (OUTPATIENT)
Dept: URGENT CARE | Facility: CLINIC | Age: 32
End: 2017-06-05
Payer: COMMERCIAL

## 2017-06-05 VITALS
HEIGHT: 60 IN | BODY MASS INDEX: 26.9 KG/M2 | WEIGHT: 137 LBS | OXYGEN SATURATION: 98 % | DIASTOLIC BLOOD PRESSURE: 80 MMHG | HEART RATE: 79 BPM | SYSTOLIC BLOOD PRESSURE: 114 MMHG | TEMPERATURE: 98.7 F

## 2017-06-05 DIAGNOSIS — N39.0 URINARY TRACT INFECTION WITHOUT HEMATURIA, SITE UNSPECIFIED: ICD-10-CM

## 2017-06-05 DIAGNOSIS — R35.0 URINARY FREQUENCY: ICD-10-CM

## 2017-06-05 DIAGNOSIS — M54.50 ACUTE BILATERAL LOW BACK PAIN WITHOUT SCIATICA: ICD-10-CM

## 2017-06-05 LAB
APPEARANCE UR: NORMAL
BILIRUB UR STRIP-MCNC: NEGATIVE MG/DL
COLOR UR AUTO: YELLOW
GLUCOSE UR STRIP.AUTO-MCNC: NEGATIVE MG/DL
KETONES UR STRIP.AUTO-MCNC: NEGATIVE MG/DL
LEUKOCYTE ESTERASE UR QL STRIP.AUTO: NORMAL
NITRITE UR QL STRIP.AUTO: NEGATIVE
PH UR STRIP.AUTO: 7.5 [PH] (ref 5–8)
PROT UR QL STRIP: NEGATIVE MG/DL
RBC UR QL AUTO: NORMAL
SP GR UR STRIP.AUTO: 1.01
UROBILINOGEN UR STRIP-MCNC: NEGATIVE MG/DL

## 2017-06-05 PROCEDURE — 81002 URINALYSIS NONAUTO W/O SCOPE: CPT | Performed by: NURSE PRACTITIONER

## 2017-06-05 PROCEDURE — 99214 OFFICE O/P EST MOD 30 MIN: CPT | Performed by: NURSE PRACTITIONER

## 2017-06-05 RX ORDER — SULFAMETHOXAZOLE AND TRIMETHOPRIM 800; 160 MG/1; MG/1
1 TABLET ORAL 2 TIMES DAILY
Qty: 6 TAB | Refills: 0 | Status: SHIPPED | OUTPATIENT
Start: 2017-06-05

## 2017-06-05 ASSESSMENT — ENCOUNTER SYMPTOMS
FEVER: 0
CHILLS: 0
NAUSEA: 1
ABDOMINAL PAIN: 0

## 2017-06-05 NOTE — MR AVS SNAPSHOT
"        Lucy Aldrich   2017 9:15 AM   Office Visit   MRN: [id number]    Department:  [facility]   Dept Phone:  [phone]    Description:  Female : 1985   Provider:  AYLIN Adams           Reason for Visit     Urinary Frequency started with lower back pain yesterday, poss uti xthis morning       Allergies as of 2017     No Known Allergies      You were diagnosed with     Acute bilateral low back pain without sciatica   [0935721]       Urinary frequency   [788.41.ICD-9-CM]       Urinary tract infection without hematuria, site unspecified   [2144738]       Urinary tract infection without hematuria, site unspecified   [6989144]         Vital Signs     Blood Pressure Pulse Temperature Height Weight Body Mass Index    114/80 mmHg 79 37.1 °C (98.7 °F) 1.511 m (4' 11.5\") 62.143 kg (137 lb) 27.22 kg/m2    Oxygen Saturation Smoking Status                98% Never Smoker           Basic Information     Date Of Birth Sex Race Ethnicity Preferred Language    1985 Female Unable to Obtain  Origin (Vietnamese,Namibian,Russian,Bangladeshi, etc) English      Your appointments     2017  7:20 AM   New Patient with Han Esposito M.D.   [facility]    [address], [address]  [facility] [address] [phone]           Please bring Photo ID, Insurance Cards, All Medication Bottles and copies of any legal documents (such as Living Will, Power of ) If speaking a language besides English please bring an adult . Please arrive 30 minutes prior for check in and registration. You will be receiving a confirmation call a few days before your appointment from our automated call confirmation system.            2017  2:20 PM   Established Patient with Gene Richard M.D.   [facility] (--)    [address] [facility] [address]  [address]  [facility] [address] [phone]           You will be receiving a confirmation " call a few days before your appointment from our automated call confirmation system.              Problem List              ICD-10-CM Priority Class Noted - Resolved    Thyroid nodule E04.1   3/22/2017 - Present    Gastroesophageal reflux disease without esophagitis K21.9   3/22/2017 - Present    Anxiety F41.9   3/22/2017 - Present    Seasonal allergic rhinitis due to pollen J30.1   3/22/2017 - Present    Contraceptive devices, intrauterine Z97.5   3/22/2017 - Present    Primary insomnia F51.01   3/22/2017 - Present    Vitamin B12 deficiency E53.8   4/12/2017 - Present    Microcytosis R71.8   4/12/2017 - Present      Health Maintenance        Date Due Completion Dates    IMM DTaP/Tdap/Td Vaccine (1 - Tdap) 2/15/2003 ---    PAP SMEAR 2/15/2005 ---            Results     POCT Urinalysis      Component Value Standard Range & Units    POC Color yellow Negative    POC Appearance cloudy Negative    POC Leukocyte Esterase trace Negative    POC Nitrites negative Negative    POC Urobiligen negative Negative (0.2) mg/dL    POC Protein negative Negative mg/dL    POC Urine PH 7.5 5.0 - 8.0    POC Blood large Negative    POC Specific Gravity 1.010 <1.005 - >1.030    POC Ketones negative Negative mg/dL    POC Biliruben negative Negative mg/dL    POC Glucose negative Negative mg/dL                        Current Immunizations     Influenza Vaccine Quad Inj (Pf) 10/11/2016      Below and/or attached are the medications your provider expects you to take. Review all of your home medications and newly ordered medications with your provider and/or pharmacist. Follow medication instructions as directed by your provider and/or pharmacist. Please keep your medication list with you and share with your provider. Update the information when medications are discontinued, doses are changed, or new medications (including over-the-counter products) are added; and carry medication information at all times in the event of emergency situations      Allergies:  No Known Allergies          Medications  Valid as of: June 05, 2017 -  9:52 AM    Generic Name Brand Name Tablet Size Instructions for use    Cetirizine HCl (Tab) ZYRTEC 10 MG Take 10 mg by mouth every day.        DiphenhydrAMINE HCl (Tab) BENADRYL 25 MG Take 25 mg by mouth every 6 hours as needed for Sleep.        Levonorgestrel (IUD) MIRENA 20 MCG/24HR 1 Each by Intrauterine route Once.        Pantoprazole Sodium (Tablet Delayed Response) PROTONIX 40 MG Take 40 mg by mouth every day.        Sulfamethoxazole-Trimethoprim (Tab) BACTRIM -160 MG Take 1 Tab by mouth 2 times a day.        Zolpidem Tartrate (Tab) AMBIEN 10 MG Take 10 mg by mouth at bedtime as needed for Sleep.        .                 Medicines prescribed today were sent to:     [facility] [address] [place] [address], [place] - [address] [facility]  [place] [address] [facility] [address]    Phone: [phone] Fax: [phone]    Open 24 Hours?: No      Medication refill instructions:       If your prescription bottle indicates you have medication refills left, it is not necessary to call your provider’s office. Please contact your pharmacy and they will refill your medication.    If your prescription bottle indicates you do not have any refills left, you may request refills at any time through one of the following ways: The online [employer] system (except Urgent Care), by calling your provider’s office, or by asking your pharmacy to contact your provider’s office with a refill request. Medication refills are processed only during regular business hours and may not be available until the next business day. Your provider may request additional information or to have a follow-up visit with you prior to refilling your medication.   *Please Note: Medication refills are assigned a new Rx number when refilled electronically. Your pharmacy may indicate that no refills were authorized even though a new prescription for  the same medication is available at the pharmacy. Please request the medicine by name with the pharmacy before contacting your provider for a refill.           MyChart Access Code: Activation code not generated  Current MyChart Status: Active

## 2017-06-05 NOTE — PROGRESS NOTES
"Subjective:      Lucy Aldrich is a 32 y.o. female who presents with Urinary Frequency            Urinary Frequency  This is a new problem. The current episode started yesterday. The problem occurs constantly. The problem has been unchanged. Associated symptoms include nausea and urinary symptoms. Pertinent negatives include no abdominal pain, chills or fever. Associated symptoms comments: +back pain. Nothing aggravates the symptoms. She has tried nothing for the symptoms.       Review of Systems   Constitutional: Negative for fever and chills.   Gastrointestinal: Positive for nausea. Negative for abdominal pain.          Objective:     /80 mmHg  Pulse 79  Temp(Src) 37.1 °C (98.7 °F)  Ht 1.511 m (4' 11.5\")  Wt 62.143 kg (137 lb)  BMI 27.22 kg/m2  SpO2 98%     Physical Exam   Constitutional: She is oriented to person, place, and time. She appears well-developed and well-nourished. No distress.   Cardiovascular: Normal rate, regular rhythm and normal heart sounds.    No murmur heard.  Pulmonary/Chest: Effort normal and breath sounds normal. No respiratory distress.   Abdominal: Soft. Bowel sounds are normal. There is tenderness in the suprapubic area. There is no CVA tenderness.   Musculoskeletal: Normal range of motion.   Moves all 4 extremities normally   Neurological: She is alert and oriented to person, place, and time.   Skin: Skin is warm and dry.   Psychiatric: She has a normal mood and affect. Her behavior is normal. Thought content normal.   Nursing note and vitals reviewed.              Assessment/Plan:     1. Acute bilateral low back pain without sciatica  POCT Urinalysis   2. Urinary frequency     3. Urinary tract infection without hematuria, site unspecified  sulfamethoxazole-trimethoprim (BACTRIM DS) 800-160 MG tablet   4. Urinary tract infection without hematuria, site unspecified [N39.0]       Trace leuks.  Bactrim x 3 days.  Differential diagnosis, natural history, supportive care, " and indications for immediate follow-up discussed at length.

## 2017-06-09 ENCOUNTER — OFFICE VISIT (OUTPATIENT)
Dept: ENDOCRINOLOGY | Facility: MEDICAL CENTER | Age: 32
End: 2017-06-09
Payer: COMMERCIAL

## 2017-06-09 VITALS
OXYGEN SATURATION: 97 % | HEIGHT: 59 IN | BODY MASS INDEX: 27.38 KG/M2 | SYSTOLIC BLOOD PRESSURE: 102 MMHG | HEART RATE: 118 BPM | WEIGHT: 135.8 LBS | DIASTOLIC BLOOD PRESSURE: 72 MMHG

## 2017-06-09 DIAGNOSIS — E04.2 MULTIPLE THYROID NODULES: ICD-10-CM

## 2017-06-09 DIAGNOSIS — Z86.39 HISTORY OF HYPOTHYROIDISM: ICD-10-CM

## 2017-06-09 PROCEDURE — 99204 OFFICE O/P NEW MOD 45 MIN: CPT | Performed by: INTERNAL MEDICINE

## 2017-06-09 NOTE — MR AVS SNAPSHOT
"        Lucy Aldrich   2017 7:20 AM   Office Visit   MRN: [id number]    Department:  [facility]   Dept Phone:  [phone]    Description:  Female : 1985   Provider:  Han Esposito M.D.           Reason for Visit     New Patient Thyroid Nodule      Allergies as of 2017     No Known Allergies      You were diagnosed with     Multiple thyroid nodules   [858171]       History of hypothyroidism   [346166]         Vital Signs     Blood Pressure Pulse Height Weight Body Mass Index Oxygen Saturation    102/72 mmHg 118 1.511 m (4' 11.49\") 61.598 kg (135 lb 12.8 oz) 26.98 kg/m2 97%    Last Menstrual Period Smoking Status                2017 Never Smoker           Basic Information     Date Of Birth Sex Race Ethnicity Preferred Language    1985 Female Unable to Obtain  Origin (Cymro,Welsh,Montenegrin,Moroccan, etc) English      Your appointments     2017  2:20 PM   Established Patient with Gene Richard M.D.   [facility] (--)    [address]  [address]  [facility] [address]   [phone]           You will be receiving a confirmation call a few days before your appointment from our automated call confirmation system.              Problem List              ICD-10-CM Priority Class Noted - Resolved    Thyroid nodule E04.1   3/22/2017 - Present    Gastroesophageal reflux disease without esophagitis K21.9   3/22/2017 - Present    Anxiety F41.9   3/22/2017 - Present    Seasonal allergic rhinitis due to pollen J30.1   3/22/2017 - Present    Contraceptive devices, intrauterine Z97.5   3/22/2017 - Present    Primary insomnia F51.01   3/22/2017 - Present    Vitamin B12 deficiency E53.8   2017 - Present    Microcytosis R71.8   2017 - Present      Health Maintenance        Date Due Completion Dates    IMM DTaP/Tdap/Td Vaccine (1 - Tdap) 2/15/2003 ---    PAP SMEAR 2/15/2005 ---            Current Immunizations     Influenza Vaccine Quad Inj (Pf) " 10/11/2016      Below and/or attached are the medications your provider expects you to take. Review all of your home medications and newly ordered medications with your provider and/or pharmacist. Follow medication instructions as directed by your provider and/or pharmacist. Please keep your medication list with you and share with your provider. Update the information when medications are discontinued, doses are changed, or new medications (including over-the-counter products) are added; and carry medication information at all times in the event of emergency situations     Allergies:  No Known Allergies          Medications  Valid as of: June 09, 2017 -  9:59 AM    Generic Name Brand Name Tablet Size Instructions for use    Cetirizine HCl (Tab) ZYRTEC 10 MG Take 10 mg by mouth every day.        DiphenhydrAMINE HCl (Tab) BENADRYL 25 MG Take 25 mg by mouth every 6 hours as needed for Sleep.        Levonorgestrel (IUD) MIRENA 20 MCG/24HR 1 Each by Intrauterine route Once.        Pantoprazole Sodium (Tablet Delayed Response) PROTONIX 40 MG Take 40 mg by mouth every day.        Sulfamethoxazole-Trimethoprim (Tab) BACTRIM -160 MG Take 1 Tab by mouth 2 times a day.        Zolpidem Tartrate (Tab) AMBIEN 10 MG Take 10 mg by mouth at bedtime as needed for Sleep.        .                 Medicines prescribed today were sent to:     [id number]  [place] - [address] [facility] [address]    [address] [place] [address]    Phone: [phone] Fax: [phone]    Open 24 Hours?: No      Medication refill instructions:       If your prescription bottle indicates you have medication refills left, it is not necessary to call your provider’s office. Please contact your pharmacy and they will refill your medication.    If your prescription bottle indicates you do not have any refills left, you may request refills at any time through one of the following ways: The online [employer]  system (except Urgent Care), by calling your provider’s office, or by asking your pharmacy to contact your provider’s office with a refill request. Medication refills are processed only during regular business hours and may not be available until the next business day. Your provider may request additional information or to have a follow-up visit with you prior to refilling your medication.   *Please Note: Medication refills are assigned a new Rx number when refilled electronically. Your pharmacy may indicate that no refills were authorized even though a new prescription for the same medication is available at the pharmacy. Please request the medicine by name with the pharmacy before contacting your provider for a refill.        Your To Do List     Future Labs/Procedures Complete By Expires    FREE THYROXINE  3/19/2018 6/9/2018    TSH  3/19/2018 6/9/2018    US-SOFT TISSUES OF HEAD - NECK  3/19/2018 6/9/2018    Scheduling Instructions:    Please schedule for March 2018         [employer] Interactive Access Code: Activation code not generated  Current Play It Interactive Status: Active

## 2017-06-09 NOTE — PROGRESS NOTES
New Patient Consult Note  Primary care physician: Gene Richard M.D.    Reason for consult: Multiple thyroid nodules    HPI:  Lucy Aldrich is a 32 y.o. old patient who comes in today for evaluation of her nodules. In 2013 she was found to have a left thyroid lobe nodule, she had a biopsy of this nodule done which came back benign, I do not have that report. She had serial thyroid ultrasound initially every 6 months and then every year, the left thyroid lobe nodule remained stable in size. Repeat thyroid ultrasound in March 2017 showed 9.2 mm nodule in the left thyroid lobe and 5.0 mm nodule in the right thyroid lobe, which was not present in prior ultrasounds. No family history of thyroid cancer. She has family history of hypothyroidism. She took levothyroxine from 2013 to May 2016. She discontinued levothyroxine in May 2016 and has had normal thyroid hormone level since then. Has some discomfort in the right neck, however it is slightly higher than the expected location of the thyroid.    ROS:  Constitutional: No unintentional weight loss  Cardiac: No palpitations or racing heart  ENT: Positive for right mid anterior neck pain  All other systems were reviewed and were negative.    Past Medical History:  Patient Active Problem List    Diagnosis Date Noted   • Vitamin B12 deficiency 04/12/2017   • Microcytosis 04/12/2017   • Thyroid nodule 03/22/2017   • Gastroesophageal reflux disease without esophagitis 03/22/2017   • Anxiety 03/22/2017   • Seasonal allergic rhinitis due to pollen 03/22/2017   • Contraceptive devices, intrauterine 03/22/2017   • Primary insomnia 03/22/2017       Past Surgical History:  Past Surgical History   Procedure Laterality Date   • Abdominal exploration       exploratory   • Open reduction       tail bone   • Cholecystectomy         Allergies:  Review of patient's allergies indicates no known allergies.    Social History:  Social History     Social History   • Marital Status:   "    Spouse Name: N/A   • Number of Children: N/A   • Years of Education: N/A     Occupational History   • Not on file.     Social History Main Topics   • Smoking status: Never Smoker    • Smokeless tobacco: Never Used   • Alcohol Use: 0.6 oz/week     1 Shots of liquor per week      Comment: rare   • Drug Use: No   • Sexual Activity: Yes     Other Topics Concern   • Not on file     Social History Narrative       Family History:  Family History   Problem Relation Age of Onset   • Stroke Mother    • Hyperlipidemia Mother    • Diabetes Father    • Thyroid Sister    • Cancer Neg Hx        Medications:    Current outpatient prescriptions:   •  cetirizine (ZYRTEC) 10 MG Tab, Take 10 mg by mouth every day., Disp: , Rfl:   •  diphenhydrAMINE (BENADRYL) 25 MG Tab, Take 25 mg by mouth every 6 hours as needed for Sleep., Disp: , Rfl:   •  levonorgestrel (MIRENA) 20 MCG/24HR IUD, 1 Each by Intrauterine route Once., Disp: , Rfl:   •  pantoprazole (PROTONIX) 40 MG Tablet Delayed Response, Take 40 mg by mouth every day., Disp: , Rfl:   •  sulfamethoxazole-trimethoprim (BACTRIM DS) 800-160 MG tablet, Take 1 Tab by mouth 2 times a day., Disp: 6 Tab, Rfl: 0  •  zolpidem (AMBIEN) 10 MG Tab, Take 10 mg by mouth at bedtime as needed for Sleep., Disp: , Rfl:     Labs:  Results for DARCY MORA (MRN [id number]) as of 6/9/2017 07:55   Ref. Range 3/24/2017 07:48   TSH Latest Ref Range: 0.300-3.700 uIU/mL 2.140   Thyroglobulin by LC-MC/MS-S/P Latest Ref Range: 1.3-31.8 ng/mL 1.7   Thyroglobulin Latest Ref Range: 1.3-31.8 ng/mL Not Applicable   Microsomal -Tpo- Abs Latest Ref Range: 0.0-9.0 IU/mL 1.4   Anti-Thyroglobulin Latest Ref Range: 0.0-4.0 IU/mL 60.3 (H)     Physical Examination:  Vital signs: /72 mmHg  Pulse 118  Ht 1.511 m (4' 11.49\")  Wt 61.598 kg (135 lb 12.8 oz)  BMI 26.98 kg/m2  SpO2 97%  LMP 06/06/2017  General: No apparent distress, cooperative  Eyes: No scleral icterus or discharge  ENMT: Normal on external " inspection of nose, lips  Neck: No abnormal masses on inspection  Resp: Normal effort, clear to auscultation bilaterally   CVS: Regular rate and rhythm, S1 S2 normal, no murmur   Extremities: No edema  Neuro: Alert and oriented  Skin: No rash  Psych: Normal mood and affect    Assessment and Plan:    1. Multiple thyroid nodules  · Thyroid ultrasound in March 2017 showed 9.2 mm nodule in the left thyroid lobe, which according to patient was biopsied in 2013 and came back benign; it also showed 5 mm nodule in the right thyroid lobe which was not seen previously  · We will repeat thyroid ultrasound in March 2018  - US-SOFT TISSUES OF HEAD - NECK; Future    2. History of hypothyroidism  · She took levothyroxine from 2013 to May 2016, after discontinuing levothyroxine in May 2016 her thyroid hormone levels have been normal since then  · TPO antibody is negative, thyroglobulin antibody slightly elevated  · Advised to repeat labs for TSH and free T4 in March 2018  - TSH; Future  - FREE THYROXINE; Future    Return in about 10 months (around 4/9/2018).    Thank you for allowing me to participate in the care of this patient.    Han Esposito M.D.  06/09/2017    CC:   Gene Richard M.D.    This note was created using voice recognition software (Dragon). The accuracy of the dictation is limited by the abilities of the software. I have reviewed the note prior to signing, however some errors in grammar and context are still possible. If you have any questions related to this note please do not hesitate to contact our office.